# Patient Record
Sex: FEMALE | URBAN - METROPOLITAN AREA
[De-identification: names, ages, dates, MRNs, and addresses within clinical notes are randomized per-mention and may not be internally consistent; named-entity substitution may affect disease eponyms.]

---

## 2019-01-15 ENCOUNTER — APPOINTMENT (RX ONLY)
Dept: URBAN - METROPOLITAN AREA CLINIC 23 | Facility: CLINIC | Age: 39
Setting detail: DERMATOLOGY
End: 2019-01-15

## 2019-01-15 DIAGNOSIS — Z41.9 ENCOUNTER FOR PROCEDURE FOR PURPOSES OTHER THAN REMEDYING HEALTH STATE, UNSPECIFIED: ICD-10-CM

## 2019-01-15 PROCEDURE — ? MEDICAL CONSULTATION: ULTRASHAPE

## 2019-01-21 ENCOUNTER — APPOINTMENT (RX ONLY)
Dept: URBAN - METROPOLITAN AREA CLINIC 23 | Facility: CLINIC | Age: 39
Setting detail: DERMATOLOGY
End: 2019-01-21

## 2019-01-21 DIAGNOSIS — Z41.9 ENCOUNTER FOR PROCEDURE FOR PURPOSES OTHER THAN REMEDYING HEALTH STATE, UNSPECIFIED: ICD-10-CM

## 2019-01-21 PROCEDURE — ? ULTRASHAPE

## 2019-01-21 PROCEDURE — ? VELASHAPE

## 2019-01-21 NOTE — PROCEDURE: VELASHAPE
Post-Procedure: The patient tolerated the procedure well and post-care was reviewed.
Consent: Written consent obtained, risks reviewed including but not limited to crusting, scabbing, blistering, scarring, darker or lighter pigmentary change, and/or incomplete improvement.
Anesthesia Volume In Cc: 0
Pre-Procedures Photographs: Yes
Post-Care Instructions: I reviewed with the patient in detail post-care instructions. Patient should stay away from the sun and wear sun protection until treated areas are fully healed.
Mode Used: [employer]
Treatment Area: abdomen
Detail Level: Zone
Anesthesia Type: 1% lidocaine with epinephrine
Use Distraction Techniques In Note: No
Pre-Procedure Text: The treatment areas were then cleaned and a coupling gel was applied.
Treatment Number (Optional): 1

## 2019-01-21 NOTE — PROCEDURE: ULTRASHAPE
Seca Height (Optional-Include Units): 39â
Treatment Area: abdomen
Circumference (Optional-Include Units): 33.5â
Post-Procedure: The patient tolerated the procedure well and post-care was reviewed.
Detail Level: Zone
Consent: Written consent obtained, risks reviewed including but not limited to crusting, scabbing, blistering, scarring, darker or lighter pigmentary change, and/or incomplete improvement.
Anesthesia Type: 1% lidocaine with epinephrine
Treatment Number (Optional): 1
Post-Procedures Photographs: No
Post-Care Instructions: I reviewed with the patient in detail post-care instructions. Patient should stay away from the sun and wear sun protection until treated areas are fully healed.
Focal Treatment Zones (Optional): 4
Pre-Procedures Photographs: Yes
Focal Treatment Zones (Optional): 8
Anesthesia Volume In Cc: 0
Pre-Procedure Text: The treatment areas were then cleaned and a coupling gel was applied.

## 2019-01-30 ENCOUNTER — APPOINTMENT (RX ONLY)
Dept: URBAN - METROPOLITAN AREA CLINIC 23 | Facility: CLINIC | Age: 39
Setting detail: DERMATOLOGY
End: 2019-01-30

## 2019-01-30 DIAGNOSIS — Z41.9 ENCOUNTER FOR PROCEDURE FOR PURPOSES OTHER THAN REMEDYING HEALTH STATE, UNSPECIFIED: ICD-10-CM

## 2019-01-30 PROCEDURE — ? VELASHAPE

## 2019-01-30 NOTE — PROCEDURE: VELASHAPE
Post-Procedures Photographs: No
Pre-Procedures Photographs: Yes
Detail Level: Zone
Treatment Area: abdomen
Pre-Procedure Text: The treatment areas were then cleaned and a coupling gel was applied.
Post-Procedure: The patient tolerated the procedure well and post-care was reviewed.
Treatment Number (Optional): 2
Mode Used: [employer]
Post-Care Instructions: I reviewed with the patient in detail post-care instructions. Patient should stay away from the sun and wear sun protection until treated areas are fully healed.
Anesthesia Type: 1% lidocaine with epinephrine
Anesthesia Volume In Cc: 0
Consent: Written consent obtained, risks reviewed including but not limited to crusting, scabbing, blistering, scarring, darker or lighter pigmentary change, and/or incomplete improvement.

## 2019-02-12 ENCOUNTER — APPOINTMENT (RX ONLY)
Dept: URBAN - METROPOLITAN AREA CLINIC 23 | Facility: CLINIC | Age: 39
Setting detail: DERMATOLOGY
End: 2019-02-12

## 2019-02-12 DIAGNOSIS — Z41.9 ENCOUNTER FOR PROCEDURE FOR PURPOSES OTHER THAN REMEDYING HEALTH STATE, UNSPECIFIED: ICD-10-CM

## 2019-02-12 PROCEDURE — ? VELASHAPE

## 2019-02-12 PROCEDURE — ? ULTRASHAPE

## 2019-02-12 NOTE — PROCEDURE: VELASHAPE
Post-Procedure: The patient tolerated the procedure well and post-care was reviewed.
Pre-Procedures Photographs: Yes
Anesthesia Type: 1% lidocaine with epinephrine
Mode Used: [employer]
Detail Level: Zone
Treatment Number (Optional): 3
Consent: Written consent obtained, risks reviewed including but not limited to crusting, scabbing, blistering, scarring, darker or lighter pigmentary change, and/or incomplete improvement.
Anesthesia Volume In Cc: 0
Post-Care Instructions: I reviewed with the patient in detail post-care instructions. Patient should stay away from the sun and wear sun protection until treated areas are fully healed.
Pre-Procedure Text: The treatment areas were then cleaned and a coupling gel was applied.
Treatment Area: abdomen
Use Distraction Techniques In Note: No

## 2019-02-12 NOTE — PROCEDURE: ULTRASHAPE
Pre-Procedure Text: The treatment areas were then cleaned and a coupling gel was applied.
Treatment Number (Optional): 2
Use Distraction Techniques In Note: No
Post-Procedure: The patient tolerated the procedure well and post-care was reviewed.
Detail Level: Zone
Focal Treatment Zones (Optional): 4
Consent: Written consent obtained, risks reviewed including but not limited to crusting, scabbing, blistering, scarring, darker or lighter pigmentary change, and/or incomplete improvement.
Focal Treatment Zones (Optional): 9
Treatment Area: abdomen
Anesthesia Volume In Cc: 0
Post-Care Instructions: I reviewed with the patient in detail post-care instructions. Patient should stay away from the sun and wear sun protection until treated areas are fully healed.
Pre-Procedures Photographs: Yes
Anesthesia Type: 1% lidocaine with epinephrine

## 2019-02-19 ENCOUNTER — APPOINTMENT (RX ONLY)
Dept: URBAN - METROPOLITAN AREA CLINIC 23 | Facility: CLINIC | Age: 39
Setting detail: DERMATOLOGY
End: 2019-02-19

## 2019-02-19 DIAGNOSIS — Z41.9 ENCOUNTER FOR PROCEDURE FOR PURPOSES OTHER THAN REMEDYING HEALTH STATE, UNSPECIFIED: ICD-10-CM

## 2019-02-19 PROCEDURE — ? VELASHAPE

## 2019-02-19 NOTE — PROCEDURE: VELASHAPE
Anesthesia Type: 1% lidocaine with epinephrine
Post-Care Instructions: I reviewed with the patient in detail post-care instructions. Patient should stay away from the sun and wear sun protection until treated areas are fully healed.
Pre-Procedures Photographs: Yes
Use Distraction Techniques In Note: No
Treatment Area: abdomen
Pre-Procedure Text: The treatment areas were then cleaned and a coupling gel was applied.
Mode Used: [employer]
Post-Procedure: The patient tolerated the procedure well and post-care was reviewed.
Detail Level: Zone
Consent: Written consent obtained, risks reviewed including but not limited to crusting, scabbing, blistering, scarring, darker or lighter pigmentary change, and/or incomplete improvement.
Anesthesia Volume In Cc: 0
Treatment Number (Optional): 3
Mode Used: [employer]

## 2019-02-26 ENCOUNTER — APPOINTMENT (RX ONLY)
Dept: URBAN - METROPOLITAN AREA CLINIC 23 | Facility: CLINIC | Age: 39
Setting detail: DERMATOLOGY
End: 2019-02-26

## 2019-02-26 DIAGNOSIS — Z41.9 ENCOUNTER FOR PROCEDURE FOR PURPOSES OTHER THAN REMEDYING HEALTH STATE, UNSPECIFIED: ICD-10-CM

## 2019-02-26 PROCEDURE — ? ULTRASHAPE

## 2019-02-26 PROCEDURE — ? VELASHAPE

## 2019-02-26 NOTE — PROCEDURE: ULTRASHAPE
Post-Procedures Photographs: No
Pre-Procedure Text: The treatment areas were then cleaned and a coupling gel was applied.
Anesthesia Volume In Cc: 0
Treatment Area: abdomen
Post-Procedure: The patient tolerated the procedure well and post-care was reviewed.
Treatment Number (Optional): 3
Consent: Written consent obtained, risks reviewed including but not limited to crusting, scabbing, blistering, scarring, darker or lighter pigmentary change, and/or incomplete improvement.
Anesthesia Type: 1% lidocaine with epinephrine
Pre-Procedures Photographs: Yes
Focal Treatment Zones (Optional): 4
Detail Level: Zone
Post-Care Instructions: I reviewed with the patient in detail post-care instructions. Patient should stay away from the sun and wear sun protection until treated areas are fully healed.
Focal Treatment Zones (Optional): 9

## 2019-02-26 NOTE — PROCEDURE: VELASHAPE
Pre-Procedures Photographs: Yes
Treatment Number (Optional): 5
Anesthesia Type: 1% lidocaine with epinephrine
Consent: Written consent obtained, risks reviewed including but not limited to crusting, scabbing, blistering, scarring, darker or lighter pigmentary change, and/or incomplete improvement.
Mode Used: [employer]
Post-Care Instructions: I reviewed with the patient in detail post-care instructions. Patient should stay away from the sun and wear sun protection until treated areas are fully healed.
Use Distraction Techniques In Note: No
Treatment Area: abdomen
Anesthesia Volume In Cc: 0
Detail Level: Zone
Pre-Procedure Text: The treatment areas were then cleaned and a coupling gel was applied.
Post-Procedure: The patient tolerated the procedure well and post-care was reviewed.